# Patient Record
Sex: FEMALE | Race: WHITE | Employment: FULL TIME | URBAN - METROPOLITAN AREA
[De-identification: names, ages, dates, MRNs, and addresses within clinical notes are randomized per-mention and may not be internally consistent; named-entity substitution may affect disease eponyms.]

---

## 2017-06-12 ENCOUNTER — OFFICE VISIT (OUTPATIENT)
Dept: PODIATRY CLINIC | Facility: CLINIC | Age: 52
End: 2017-06-12

## 2017-06-12 DIAGNOSIS — D36.13 NEUROMA OF FOOT: Primary | ICD-10-CM

## 2017-06-12 PROCEDURE — 99203 OFFICE O/P NEW LOW 30 MIN: CPT | Performed by: PODIATRIST

## 2017-07-17 ENCOUNTER — TELEPHONE (OUTPATIENT)
Dept: PODIATRY CLINIC | Facility: CLINIC | Age: 52
End: 2017-07-17

## 2017-07-17 RX ORDER — METHYLPREDNISOLONE 4 MG/1
TABLET ORAL
Qty: 1 PACKAGE | Refills: 0 | Status: SHIPPED | OUTPATIENT
Start: 2017-07-17 | End: 2017-09-05 | Stop reason: ALTCHOICE

## 2017-07-17 NOTE — TELEPHONE ENCOUNTER
Patient states SCR told patient to take advil for pain. Patient took medication for 3 weeks but had to stop due to stomach pains from taking advil. States foot is better but still expieriencing some pain.  Would like to know instead of cortisone shot if she

## 2017-08-01 ENCOUNTER — OFFICE VISIT (OUTPATIENT)
Dept: PODIATRY CLINIC | Facility: CLINIC | Age: 52
End: 2017-08-01

## 2017-08-01 VITALS — HEART RATE: 64 BPM | SYSTOLIC BLOOD PRESSURE: 108 MMHG | DIASTOLIC BLOOD PRESSURE: 62 MMHG | RESPIRATION RATE: 16 BRPM

## 2017-08-01 DIAGNOSIS — D36.13 NEUROMA OF FOOT: Primary | ICD-10-CM

## 2017-08-01 PROCEDURE — 20600 DRAIN/INJ JOINT/BURSA W/O US: CPT | Performed by: PODIATRIST

## 2017-08-01 PROCEDURE — 99212 OFFICE O/P EST SF 10 MIN: CPT | Performed by: PODIATRIST

## 2017-08-01 RX ORDER — METHYLPREDNISOLONE ACETATE 80 MG/ML
20 INJECTION, SUSPENSION INTRA-ARTICULAR; INTRALESIONAL; INTRAMUSCULAR; SOFT TISSUE ONCE
Status: SHIPPED | OUTPATIENT
Start: 2017-08-01

## 2017-08-01 NOTE — PROGRESS NOTES
HPI:    Patient ID: Sonja Ward is a 46year old female. HPI  This 78-year-old female presents with continued left forefoot pain unresolved by oral anti-inflammatories.   Review of Systems  I reviewed present medical status, medications were noted, sh

## 2017-08-01 NOTE — PROGRESS NOTES
Per verbal order from Dr. Theron Nelson, draw up 0.5ml of 0.5% Marcaine and 20 mg of Depo-Medrol for cortisone injection to left foot. Time out proceeded 68-/62 Pt signed consent for left foot steroid injection. Medication administration per Dr. Theron Nelson.  Pt

## 2017-09-05 ENCOUNTER — OFFICE VISIT (OUTPATIENT)
Dept: PODIATRY CLINIC | Facility: CLINIC | Age: 52
End: 2017-09-05

## 2017-09-05 DIAGNOSIS — D36.13 NEUROMA OF FOOT: Primary | ICD-10-CM

## 2017-09-05 PROCEDURE — 99212 OFFICE O/P EST SF 10 MIN: CPT | Performed by: PODIATRIST

## 2017-09-05 NOTE — PROGRESS NOTES
HPI:    Patient ID: Gita King is a 46year old female. HPI  This pleasant 57-year-old female presents post injection for neuroma third interspace left foot. She has not had a significant noted improvement after the injection.   Patient had had some

## 2017-12-01 ENCOUNTER — TELEPHONE (OUTPATIENT)
Dept: PODIATRY CLINIC | Facility: CLINIC | Age: 52
End: 2017-12-01

## 2017-12-01 NOTE — TELEPHONE ENCOUNTER
pt called. pt upset, she was sent to collections for  a No Show for [facility]  7/17/17. She spoke with billing and they suggested they speak with SCR to have removed.   The no show visit was a misunderstanding, we didn't have her updated phone number, she did

## 2017-12-04 NOTE — TELEPHONE ENCOUNTER
Call to the billing dept. Spoke to [facility]. Discussed the pt being very upset with the bill to collections and the no show charge  According to [facility] any patient is desean allowed one revoke on a No Show in a lifetime.   Servando Ruth contacted her supervi

## 2018-11-24 ENCOUNTER — TELEPHONE (OUTPATIENT)
Dept: OBGYN CLINIC | Facility: CLINIC | Age: 53
End: 2018-11-24

## 2018-11-24 NOTE — TELEPHONE ENCOUNTER
Per pt just wanted to know if we could extend the time of her annual because he may want to do u/s. Pt informed unable to extend since he was booked that morning. No further question.

## 2018-11-26 ENCOUNTER — OFFICE VISIT (OUTPATIENT)
Dept: OBGYN CLINIC | Facility: CLINIC | Age: 53
End: 2018-11-26
Payer: COMMERCIAL

## 2018-11-26 VITALS
HEART RATE: 92 BPM | DIASTOLIC BLOOD PRESSURE: 78 MMHG | SYSTOLIC BLOOD PRESSURE: 120 MMHG | BODY MASS INDEX: 24 KG/M2 | WEIGHT: 126 LBS

## 2018-11-26 DIAGNOSIS — Z12.31 SCREENING MAMMOGRAM, ENCOUNTER FOR: ICD-10-CM

## 2018-11-26 DIAGNOSIS — Z01.419 WOMEN'S ANNUAL ROUTINE GYNECOLOGICAL EXAMINATION: Primary | ICD-10-CM

## 2018-11-26 PROCEDURE — 99386 PREV VISIT NEW AGE 40-64: CPT | Performed by: OBSTETRICS & GYNECOLOGY

## 2018-11-26 NOTE — PROGRESS NOTES
HPI:    Patient ID: Debby Lynne is a 52year old female. HPI  Well woman exam  Last visit 4 years ago  Menopausal for the last 2 years. Complains of sleeping on refreshed and somewhat poorly.   Her mother told her that she holds her breath during the cryosurgery    • Spontaneous        Past Surgical History:   Procedure Laterality Date   •       40 week 7 lb(s) 9 oz Male   •       40 week 8 lb(s) 4 oz Female LCT, tubal ligation, AMA   • COLONOSCOPY N/A 10/18/2016 negative in the left inguinal area. Lymphadenopathy:        Right: No inguinal adenopathy present. Left: No inguinal adenopathy present. Breasts:    Symmetric bilaterally. Areolas without lesions.   Skin- normal without growths, lesions, eryth BILAT ([id number])        [id number]

## 2018-11-29 ENCOUNTER — TELEPHONE (OUTPATIENT)
Dept: OBGYN CLINIC | Facility: CLINIC | Age: 53
End: 2018-11-29

## 2018-11-29 NOTE — TELEPHONE ENCOUNTER
Pt informed of Dr. Rula Jason message below--    ---- Message from Farida Torrez MD sent at 11/29/2018 10:58 AM CST -----  Please inform of Pap test showing cells that are mildly changed from normal (atypical, ASCUS).   The reflex test showed presence of hi

## 2018-11-29 NOTE — TELEPHONE ENCOUNTER
----- Message from Estefania Key MD sent at 11/29/2018 10:58 AM CST -----  Please inform of Pap test showing cells that are mildly changed from normal (atypical, ASCUS). 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

## 2018-12-03 ENCOUNTER — TELEPHONE (OUTPATIENT)
Dept: OBGYN CLINIC | Facility: CLINIC | Age: 53
End: 2018-12-03

## 2018-12-03 NOTE — TELEPHONE ENCOUNTER
Pt voices she received a [facility] message on 18 that her pap was normal. Reviewed pt's 18 results with her and advised her of Dr. Sheri Apgar message below-    Notes recorded by Vanesa Redman MD on 2018 at 10:58 AM CST  Please inform of Pa

## 2018-12-10 ENCOUNTER — OFFICE VISIT (OUTPATIENT)
Dept: OBGYN CLINIC | Facility: CLINIC | Age: 53
End: 2018-12-10
Payer: COMMERCIAL

## 2018-12-10 VITALS
HEART RATE: 80 BPM | SYSTOLIC BLOOD PRESSURE: 113 MMHG | BODY MASS INDEX: 24 KG/M2 | WEIGHT: 128.38 LBS | DIASTOLIC BLOOD PRESSURE: 72 MMHG

## 2018-12-10 DIAGNOSIS — R87.810 CERVICAL HIGH RISK HUMAN PAPILLOMAVIRUS (HPV) DNA TEST POSITIVE: ICD-10-CM

## 2018-12-10 DIAGNOSIS — R87.610 ATYPICAL SQUAMOUS CELL CHANGES OF UNDETERMINED SIGNIFICANCE (ASCUS) ON CERVICAL CYTOLOGY WITH POSITIVE HIGH RISK HUMAN PAPILLOMA VIRUS (HPV): Primary | ICD-10-CM

## 2018-12-10 DIAGNOSIS — R87.610 PAPANICOLAOU SMEAR OF CERVIX WITH ATYPICAL SQUAMOUS CELLS OF UNDETERMINED SIGNIFICANCE (ASC-US): ICD-10-CM

## 2018-12-10 DIAGNOSIS — Z01.812 PRE-PROCEDURAL LABORATORY EXAMINATION: ICD-10-CM

## 2018-12-10 DIAGNOSIS — R87.810 ATYPICAL SQUAMOUS CELL CHANGES OF UNDETERMINED SIGNIFICANCE (ASCUS) ON CERVICAL CYTOLOGY WITH POSITIVE HIGH RISK HUMAN PAPILLOMA VIRUS (HPV): Primary | ICD-10-CM

## 2018-12-10 PROCEDURE — 81025 URINE PREGNANCY TEST: CPT | Performed by: OBSTETRICS & GYNECOLOGY

## 2018-12-10 PROCEDURE — 57452 EXAM OF CERVIX W/SCOPE: CPT | Performed by: OBSTETRICS & GYNECOLOGY

## 2018-12-10 PROCEDURE — 99212 OFFICE O/P EST SF 10 MIN: CPT | Performed by: OBSTETRICS & GYNECOLOGY

## 2018-12-10 NOTE — PROCEDURES
Colposcopy     Pregnancy Results: negative from urine test   Birth control method(s) used: menopausal    Consent signed. Procedure discussed with patient in detail including indication, risk, benefits, alternatives and complications. Procedure:   The pa

## 2018-12-27 ENCOUNTER — MED REC SCAN ONLY (OUTPATIENT)
Dept: OBGYN CLINIC | Facility: CLINIC | Age: 53
End: 2018-12-27

## 2019-01-19 ENCOUNTER — MED REC SCAN ONLY (OUTPATIENT)
Dept: OBGYN CLINIC | Facility: CLINIC | Age: 54
End: 2019-01-19

## 2019-09-18 ENCOUNTER — TELEPHONE (OUTPATIENT)
Dept: GASTROENTEROLOGY | Facility: CLINIC | Age: 54
End: 2019-09-18

## 2019-09-18 NOTE — TELEPHONE ENCOUNTER
----- Message from Kirby Shipman RN sent at 10/21/2016  2:44 PM CDT -----  Regarding: CLN recall  3 year CLN recall, per Dr. Leonard Garcia; CLN done 10/18/16

## 2019-09-23 ENCOUNTER — TELEPHONE (OUTPATIENT)
Dept: GASTROENTEROLOGY | Facility: CLINIC | Age: 54
End: 2019-09-23

## 2019-09-23 DIAGNOSIS — Z86.010 PERSONAL HISTORY OF COLONIC POLYPS: ICD-10-CM

## 2019-09-23 DIAGNOSIS — Z12.11 COLON CANCER SCREENING: Primary | ICD-10-CM

## 2019-09-23 NOTE — TELEPHONE ENCOUNTER
Spoke to pt and updated allergies, PCP, pharmacy, medications and the following:    Last Procedure, Date, MD: SOMMER 10/18/2016 with Dr. Nathaniel Casas  Last Diagnosis:  Impression:   1. Tortuous colon.   2. Successful colonoscopy to the cecum using pediatric colonoscope

## 2019-09-26 NOTE — TELEPHONE ENCOUNTER
Noted, thank you Dr. Nena Gregory. Please clarify the prep below per pt request. She did \"not tolerate\" the last PEG and specifically requested miraLAX/gatorade. Please advise, thank you.

## 2019-09-26 NOTE — TELEPHONE ENCOUNTER
Please schedule: CLN with [facility]. Prefer to be done in hospital, however if patient wants to do it on a Monday with her  (who also is coming in to see me for a c-scope), then it would be okay for [facility]    Please pend split trilyte bowel prep.      [place] [phone]

## 2019-09-27 NOTE — TELEPHONE ENCOUNTER
CBLM to schedule procedure. Please transfer to Autumn Duncan at ext [phone] or [phone] for scheduling.

## 2019-10-03 NOTE — TELEPHONE ENCOUNTER
Scheduled for:  Colonoscopy - 14630  Provider Name:  Dr. Vale Melton  Date:  1/7/20  Location:  [facility]  Sedation:  [facility]  Time:  7:30 am (pt is aware to arrive at 6:30 am)  Prep:  Miralax/Gatorade, Prep instructions were given to pt over the phone, pt verbalized unde

## 2020-01-06 ENCOUNTER — ANESTHESIA EVENT (OUTPATIENT)
Dept: ENDOSCOPY | Facility: HOSPITAL | Age: 55
End: 2020-01-06
Payer: COMMERCIAL

## 2020-01-07 ENCOUNTER — HOSPITAL ENCOUNTER (OUTPATIENT)
Facility: HOSPITAL | Age: 55
Setting detail: HOSPITAL OUTPATIENT SURGERY
Discharge: HOME | End: 2020-01-07
Attending: INTERNAL MEDICINE | Admitting: INTERNAL MEDICINE
Payer: COMMERCIAL

## 2020-01-07 ENCOUNTER — ANESTHESIA (OUTPATIENT)
Dept: ENDOSCOPY | Facility: HOSPITAL | Age: 55
End: 2020-01-07
Payer: COMMERCIAL

## 2020-01-07 VITALS
HEART RATE: 71 BPM | SYSTOLIC BLOOD PRESSURE: 110 MMHG | DIASTOLIC BLOOD PRESSURE: 66 MMHG | HEIGHT: 61 IN | WEIGHT: 125 LBS | BODY MASS INDEX: 23.6 KG/M2 | RESPIRATION RATE: 11 BRPM | OXYGEN SATURATION: 99 %

## 2020-01-07 DIAGNOSIS — Z86.010 PERSONAL HISTORY OF COLONIC POLYPS: ICD-10-CM

## 2020-01-07 DIAGNOSIS — Z12.11 COLON CANCER SCREENING: ICD-10-CM

## 2020-01-07 PROCEDURE — 45385 COLONOSCOPY W/LESION REMOVAL: CPT | Performed by: INTERNAL MEDICINE

## 2020-01-07 PROCEDURE — 0DBN8ZX EXCISION OF SIGMOID COLON, VIA NATURAL OR ARTIFICIAL OPENING ENDOSCOPIC, DIAGNOSTIC: ICD-10-PCS | Performed by: INTERNAL MEDICINE

## 2020-01-07 RX ORDER — SODIUM CHLORIDE, SODIUM LACTATE, POTASSIUM CHLORIDE, CALCIUM CHLORIDE 600; 310; 30; 20 MG/100ML; MG/100ML; MG/100ML; MG/100ML
INJECTION, SOLUTION INTRAVENOUS CONTINUOUS
Status: DISCONTINUED | OUTPATIENT
Start: 2020-01-07 | End: 2020-01-07

## 2020-01-07 RX ADMIN — SODIUM CHLORIDE, SODIUM LACTATE, POTASSIUM CHLORIDE, CALCIUM CHLORIDE: 600; 310; 30; 20 INJECTION, SOLUTION INTRAVENOUS at 08:09:00

## 2020-01-07 NOTE — ANESTHESIA POSTPROCEDURE EVALUATION
Patient:  Jason Wolfe    Procedure Summary     Date:  01/07/20 Room / Location:  [facility] ENDOSCOPY 01 / [facility] ENDOSCOPY    Anesthesia Start:  9019 Anesthesia Stop:      Procedure:  COLONOSCOPY (N/A ) Diagnosis:       Colon cancer screening      Personal histor

## 2020-01-07 NOTE — ANESTHESIA PREPROCEDURE EVALUATION
Anesthesia PreOp Note    HPI:     Linh Paul is a 62year old female who presents for preoperative consultation requested by: Sara Lopez MD    Date of Surgery: 1/7/2020    Procedure(s):  COLONOSCOPY  Indication: Colon cancer screening, Personal h Known Allergies    Family History   Problem Relation Age of Onset   • Cancer Other         colon cancer   • High Cholesterol Father    • Heart Disease Father    • Cancer Father         Lung cancer -asbestos   • Genito-Urinary Disorder Mother         dysfun Asked        Stress Concern: Not Asked        Weight Concern: Not Asked        Special Diet: Not Asked        Back Care: Not Asked        Exercise: Not Asked        Bike Helmet: Not Asked        Seat Belt: Not Asked        Self-Exams: Not Asked    Social H

## 2020-01-07 NOTE — H&P
History & Physical Examination    Patient Name: Sonja Ward  MRN: [id number]  [facility]: [id number]  YOB: 1965    Diagnosis: screening for colon cancer    methylPREDNISolone acetate (DEPO-medrol) 80 MG/ML injection 20 mg, 20 mg, Intra-articular proceed with plan of care. I have reviewed the History and Physical done within the last 30 days. Any changes noted above.     Luis Miguel Zhang, [address] [facility]  1/7/2020  7:37 AM

## 2020-01-07 NOTE — OPERATIVE REPORT
COLONOSCOPY REPORT    Kaylee Vora    CABRERA 10/30/1965 Age 62year old   PCP Timmy Tinoco MD     Date of procedure: 20    Procedure: Colonoscopy w/cold snare polypectomy    Pre-operative diagnosis: screening    Po hemorrhoids. 5. The colonic mucosa throughout the colon showed normal vascular pattern, without evidence of angioectasias or inflammation. Tortuous sigmoid colon. 6. GALILEO: normal rectal tone, no masses palpated.      Impression:   · One small colon gisella

## 2020-01-13 ENCOUNTER — TELEPHONE (OUTPATIENT)
Dept: GASTROENTEROLOGY | Facility: CLINIC | Age: 55
End: 2020-01-13

## 2020-01-13 NOTE — TELEPHONE ENCOUNTER
I mailed out colonoscopy results letter to pt  Updated health maintenance  Entered into 5 yr CLN recall  Recall colon in 5 years per.  Colon done 1/07/2020    GI staff: please place recall for colonoscopy in 5 years

## 2020-05-20 ENCOUNTER — OFFICE VISIT (OUTPATIENT)
Dept: PODIATRY CLINIC | Facility: CLINIC | Age: 55
End: 2020-05-20
Payer: COMMERCIAL

## 2020-05-20 ENCOUNTER — TELEPHONE (OUTPATIENT)
Dept: PODIATRY CLINIC | Facility: CLINIC | Age: 55
End: 2020-05-20

## 2020-05-20 DIAGNOSIS — D36.10 NEUROMA: Primary | ICD-10-CM

## 2020-05-20 PROCEDURE — 99213 OFFICE O/P EST LOW 20 MIN: CPT | Performed by: PODIATRIST

## 2020-05-20 NOTE — TELEPHONE ENCOUNTER
Per pt her left foot is bothering her, has hard time walking, asking for appointment soon. Pt was last seen in 2017. Please advise thank you.

## 2020-05-20 NOTE — TELEPHONE ENCOUNTER
S/w pt and she states her left foot pain hasn't improved since LOV in 2017, but pain has increased over the past 2 months and there is numbness. Pt denies any swelling. Pt can WB. Pt rates her pain 9/10. Pt takes advil 400mg BID prn pain.  appt made today @

## 2020-05-20 NOTE — PROGRESS NOTES
HPI:    Patient ID: Abdon Pruett is a 62year old female. This 12-year-old female presents to the office having not been seen in almost 3 years. She states that she is having the same left foot pain that has not resolved.   It seems to be getting wors diagnosis)    No orders of the defined types were placed in this encounter.       Meds This Visit:  Requested Prescriptions      No prescriptions requested or ordered in this encounter       Imaging & Referrals:  None       XD#[id number]

## 2020-05-29 ENCOUNTER — TELEPHONE (OUTPATIENT)
Dept: PODIATRY CLINIC | Facility: CLINIC | Age: 55
End: 2020-05-29

## 2020-05-29 NOTE — TELEPHONE ENCOUNTER
S/w pt. Scheduled her procedure with Dr. Mandie Hawkins on 6/10/2020 for neuroma 3rd left toe. Went over meds she would need to stop one week prior to sx, and location of sx. Scheduled her 1 & 2 week post op visits. Answered all of her questions and concerns.

## 2020-06-09 ENCOUNTER — LAB REQUISITION (OUTPATIENT)
Dept: LAB | Facility: HOSPITAL | Age: 55
End: 2020-06-09
Payer: COMMERCIAL

## 2020-06-09 ENCOUNTER — TELEPHONE (OUTPATIENT)
Dept: PODIATRY CLINIC | Facility: CLINIC | Age: 55
End: 2020-06-09

## 2020-06-09 DIAGNOSIS — Z20.828 CONTACT WITH AND (SUSPECTED) EXPOSURE TO OTHER VIRAL COMMUNICABLE DISEASES: ICD-10-CM

## 2020-06-09 NOTE — TELEPHONE ENCOUNTER
S/w pt and she states she didn't like how the sx center wanted her to put a credit card on file and how much she would have to pay out of pocket for sx.  I did inform her that Dr. Paco Morgan does most of his sx at [facility] and that it would most likely be too late

## 2020-06-09 NOTE — TELEPHONE ENCOUNTER
Pt called stating pt is scheduled for surgery 6-10-20 at the [facility]. Pt has question,  Can surgery be done at the hospital instead.   Call pt to advise

## 2020-06-10 ENCOUNTER — LAB REQUISITION (OUTPATIENT)
Dept: LAB | Facility: HOSPITAL | Age: 55
End: 2020-06-10
Payer: COMMERCIAL

## 2020-06-10 DIAGNOSIS — G57.60 LESION OF PLANTAR NERVE, UNSPECIFIED LOWER LIMB: ICD-10-CM

## 2020-06-10 PROCEDURE — 88304 TISSUE EXAM BY PATHOLOGIST: CPT | Performed by: PODIATRIST

## 2020-06-15 ENCOUNTER — OFFICE VISIT (OUTPATIENT)
Dept: PODIATRY CLINIC | Facility: CLINIC | Age: 55
End: 2020-06-15
Payer: COMMERCIAL

## 2020-06-15 DIAGNOSIS — D36.10 NEUROMA: Primary | ICD-10-CM

## 2020-06-15 PROCEDURE — 99024 POSTOP FOLLOW-UP VISIT: CPT | Performed by: PODIATRIST

## 2020-06-15 NOTE — PROGRESS NOTES
HPI:    Patient ID: Fidelia Tolentino is a 62year old female. This 53-year-old female presents postsurgical for neuroma third interspace left foot. She is taken some medication for pain the first day or so but now is doing quite well.   She does have some g

## 2020-06-22 ENCOUNTER — OFFICE VISIT (OUTPATIENT)
Dept: PODIATRY CLINIC | Facility: CLINIC | Age: 55
End: 2020-06-22
Payer: COMMERCIAL

## 2020-06-22 DIAGNOSIS — D36.10 NEUROMA: Primary | ICD-10-CM

## 2020-06-22 PROCEDURE — 99024 POSTOP FOLLOW-UP VISIT: CPT | Performed by: PODIATRIST

## 2020-06-22 NOTE — PROGRESS NOTES
HPI:    Patient ID: eGetha Hong is a 62year old female. This 42-year-old female presents 2 weeks post neuroma excision third interspace left foot.   Patient is not requiring pain medication she has some discomfort but it does not seem to be limiting he

## 2020-06-29 ENCOUNTER — TELEPHONE (OUTPATIENT)
Dept: PODIATRY CLINIC | Facility: CLINIC | Age: 55
End: 2020-06-29

## 2020-06-29 NOTE — TELEPHONE ENCOUNTER
Spoke to pt and she wanted to confirm Dr Betsy Wright instructions from [address] [place] [address] on 06/22/20. Advised pt of the following from Dr. Pricilla Morgan office notes:  \"A light dry sterile dressing was reapplied and reinforced.   Continue limited weightbearing elevation an

## 2020-06-29 NOTE — TELEPHONE ENCOUNTER
Spoke to pt and informed her of Juan Manuel's response and instructions. Pt verbalized understanding.

## 2020-07-13 ENCOUNTER — OFFICE VISIT (OUTPATIENT)
Dept: PODIATRY CLINIC | Facility: CLINIC | Age: 55
End: 2020-07-13
Payer: COMMERCIAL

## 2020-07-13 DIAGNOSIS — D36.10 NEUROMA: Primary | ICD-10-CM

## 2020-07-13 PROCEDURE — 99024 POSTOP FOLLOW-UP VISIT: CPT | Performed by: PODIATRIST

## 2020-07-13 NOTE — PROGRESS NOTES
HPI:    Patient ID: Sonja Ward is a 62year old female. 49-year-old female presents postsurgical for neuroma third interspace left foot. She really has no specific noted complaints or concerns and states she thinks she is doing well.   She is more and

## 2020-08-10 ENCOUNTER — OFFICE VISIT (OUTPATIENT)
Dept: PODIATRY CLINIC | Facility: CLINIC | Age: 55
End: 2020-08-10
Payer: COMMERCIAL

## 2020-08-10 DIAGNOSIS — D36.10 NEUROMA: Primary | ICD-10-CM

## 2020-08-10 PROCEDURE — 99024 POSTOP FOLLOW-UP VISIT: CPT | Performed by: PODIATRIST

## 2020-08-10 NOTE — PROGRESS NOTES
HPI:    Patient ID: Pastor Singleton is a 62year old female. 41-year-old female presents postsurgical for neuroma third interspace left foot. She has no noted specific complaints or concerns.   She has returned to all normal weightbearing activities withou

## 2022-02-10 NOTE — PROGRESS NOTES
HPI:    Patient ID: Pastor Singleton is a 46year old female. HPI  [de-identified] pleasant 12-year-old female presents as a new patient to me and states that she is referred by her sister.   Chief complaint is left forefoot pain that has been present for about 6 month requested or ordered in this encounter    Imaging & Referrals:  None       TC#[id number] Quality 130: Documentation Of Current Medications In The Medical Record: Current Medications Documented Detail Level: Detailed

## 2023-03-06 ENCOUNTER — TELEPHONE (OUTPATIENT)
Dept: OPHTHALMOLOGY | Facility: CLINIC | Age: 58
End: 2023-03-06

## 2023-03-06 NOTE — TELEPHONE ENCOUNTER
Per pt is seeing a \"wavy green color\", asking if she is needing to schedule appt. Pt was last seen 2016.  Please advise

## 2023-03-06 NOTE — TELEPHONE ENCOUNTER
Spoke to pt and pt states she has been having a sinus infection (started on 2/28/23) and states she noticed a \"lime green\" shadow on objects and sees wavy lines. Pt states she has noticed a bit of improvement now that she is on antibiotics for the sinus infection but is still experiencing the wavy lines and green shadow and wants to make sure eyes are ok. Pt's last eye exam was about 3 months ago with Dr. James Verdugo at [facility] (undilated)  and was told she did not need any glasses. Offered to schedule pt tomorrow or Wednesday but pt declined and asked for an appointment on Thursday. Scheduled pt on 3/9/23 @ 8:30am for a dilated eye exam with VENKATESH.

## 2023-03-09 ENCOUNTER — OFFICE VISIT (OUTPATIENT)
Dept: OPHTHALMOLOGY | Facility: CLINIC | Age: 58
End: 2023-03-09

## 2023-03-09 DIAGNOSIS — H35.81 MACULAR EDEMA OF RIGHT EYE: Primary | ICD-10-CM

## 2023-03-09 DIAGNOSIS — H25.13 AGE-RELATED NUCLEAR CATARACT OF BOTH EYES: ICD-10-CM

## 2023-03-09 DIAGNOSIS — H43.393 FLOATERS IN VISUAL FIELD, BILATERAL: ICD-10-CM

## 2023-03-09 PROCEDURE — 92004 COMPRE OPH EXAM NEW PT 1/>: CPT | Performed by: OPHTHALMOLOGY

## 2023-03-09 RX ORDER — AMOXICILLIN 875 MG/1
875 TABLET, COATED ORAL 2 TIMES DAILY
COMMUNITY
Start: 2023-03-02

## 2023-03-09 NOTE — ASSESSMENT & PLAN NOTE
There is no evidence of retinal pathology. All signs and symptoms of retinal detachment/tears explained in detail. Patient instructed to call the office if they experience those symptoms. Information given.

## 2023-03-09 NOTE — PATIENT INSTRUCTIONS
Macular edema of right eye  Pt scheduled with Dr. Jamie Padron tomorrow 3/10/23 at 8:30    Floaters in visual field, bilateral   There is no evidence of retinal pathology. All signs and symptoms of retinal detachment/tears explained in detail. Patient instructed to call the office if they experience those symptoms. Information given. Age-related nuclear cataract of both eyes  Discussed early cataracts with patient. Told patient that cataracts are age appropriate and they are not surgical at this time. No treatment recommended at this time.

## 2023-03-13 ENCOUNTER — HOSPITAL ENCOUNTER (EMERGENCY)
Facility: HOSPITAL | Age: 58
Discharge: HOME | End: 2023-03-13
Attending: STUDENT IN AN ORGANIZED HEALTH CARE EDUCATION/TRAINING PROGRAM
Payer: MEDICAID

## 2023-03-13 ENCOUNTER — APPOINTMENT (OUTPATIENT)
Dept: MRI IMAGING | Facility: HOSPITAL | Age: 58
End: 2023-03-13
Attending: STUDENT IN AN ORGANIZED HEALTH CARE EDUCATION/TRAINING PROGRAM
Payer: MEDICAID

## 2023-03-13 VITALS
OXYGEN SATURATION: 100 % | DIASTOLIC BLOOD PRESSURE: 55 MMHG | TEMPERATURE: 99 F | HEIGHT: 61 IN | HEART RATE: 60 BPM | WEIGHT: 120 LBS | SYSTOLIC BLOOD PRESSURE: 110 MMHG | RESPIRATION RATE: 18 BRPM | BODY MASS INDEX: 22.66 KG/M2

## 2023-03-13 DIAGNOSIS — C69.31 CHOROIDAL MALIGNANT MELANOMA, RIGHT (HCC): Primary | ICD-10-CM

## 2023-03-13 LAB
ALBUMIN SERPL-MCNC: 4.1 G/DL (ref 3.4–5)
ALP LIVER SERPL-CCNC: 63 U/L
ALT SERPL-CCNC: 21 U/L
ANION GAP SERPL CALC-SCNC: 8 MMOL/L (ref 0–18)
AST SERPL-CCNC: 18 U/L (ref 15–37)
BASOPHILS # BLD AUTO: 0.05 X10(3) UL (ref 0–0.2)
BASOPHILS NFR BLD AUTO: 0.6 %
BILIRUB DIRECT SERPL-MCNC: 0.1 MG/DL (ref 0–0.2)
BILIRUB SERPL-MCNC: 0.7 MG/DL (ref 0.1–2)
BUN BLD-MCNC: 18 MG/DL (ref 7–18)
BUN/CREAT SERPL: 21.4 (ref 10–20)
CALCIUM BLD-MCNC: 9.3 MG/DL (ref 8.5–10.1)
CHLORIDE SERPL-SCNC: 108 MMOL/L (ref 98–112)
CO2 SERPL-SCNC: 25 MMOL/L (ref 21–32)
CREAT BLD-MCNC: 0.84 MG/DL
DEPRECATED RDW RBC AUTO: 42.9 FL (ref 35.1–46.3)
EOSINOPHIL # BLD AUTO: 0.06 X10(3) UL (ref 0–0.7)
EOSINOPHIL NFR BLD AUTO: 0.7 %
ERYTHROCYTE [DISTWIDTH] IN BLOOD BY AUTOMATED COUNT: 13.1 % (ref 11–15)
GFR SERPLBLD BASED ON 1.73 SQ M-ARVRAT: 80 ML/MIN/1.73M2 (ref 60–?)
GLUCOSE BLD-MCNC: 91 MG/DL (ref 70–99)
HCT VFR BLD AUTO: 42.7 %
HGB BLD-MCNC: 13.8 G/DL
IMM GRANULOCYTES # BLD AUTO: 0.01 X10(3) UL (ref 0–1)
IMM GRANULOCYTES NFR BLD: 0.1 %
LYMPHOCYTES # BLD AUTO: 2.8 X10(3) UL (ref 1–4)
LYMPHOCYTES NFR BLD AUTO: 35 %
MCH RBC QN AUTO: 28.6 PG (ref 26–34)
MCHC RBC AUTO-ENTMCNC: 32.3 G/DL (ref 31–37)
MCV RBC AUTO: 88.6 FL
MONOCYTES # BLD AUTO: 0.33 X10(3) UL (ref 0.1–1)
MONOCYTES NFR BLD AUTO: 4.1 %
NEUTROPHILS # BLD AUTO: 4.76 X10 (3) UL (ref 1.5–7.7)
NEUTROPHILS # BLD AUTO: 4.76 X10(3) UL (ref 1.5–7.7)
NEUTROPHILS NFR BLD AUTO: 59.5 %
OSMOLALITY SERPL CALC.SUM OF ELEC: 293 MOSM/KG (ref 275–295)
PLATELET # BLD AUTO: 253 10(3)UL (ref 150–450)
POTASSIUM SERPL-SCNC: 4.1 MMOL/L (ref 3.5–5.1)
PROT SERPL-MCNC: 7.5 G/DL (ref 6.4–8.2)
RBC # BLD AUTO: 4.82 X10(6)UL
SODIUM SERPL-SCNC: 141 MMOL/L (ref 136–145)
WBC # BLD AUTO: 8 X10(3) UL (ref 4–11)

## 2023-03-13 PROCEDURE — 85025 COMPLETE CBC W/AUTO DIFF WBC: CPT | Performed by: STUDENT IN AN ORGANIZED HEALTH CARE EDUCATION/TRAINING PROGRAM

## 2023-03-13 PROCEDURE — 80076 HEPATIC FUNCTION PANEL: CPT | Performed by: STUDENT IN AN ORGANIZED HEALTH CARE EDUCATION/TRAINING PROGRAM

## 2023-03-13 PROCEDURE — A9575 INJ GADOTERATE MEGLUMI 0.1ML: HCPCS | Performed by: STUDENT IN AN ORGANIZED HEALTH CARE EDUCATION/TRAINING PROGRAM

## 2023-03-13 PROCEDURE — 96374 THER/PROPH/DIAG INJ IV PUSH: CPT

## 2023-03-13 PROCEDURE — 70543 MRI ORBT/FAC/NCK W/O &W/DYE: CPT | Performed by: STUDENT IN AN ORGANIZED HEALTH CARE EDUCATION/TRAINING PROGRAM

## 2023-03-13 PROCEDURE — 99285 EMERGENCY DEPT VISIT HI MDM: CPT

## 2023-03-13 PROCEDURE — 70553 MRI BRAIN STEM W/O & W/DYE: CPT | Performed by: STUDENT IN AN ORGANIZED HEALTH CARE EDUCATION/TRAINING PROGRAM

## 2023-03-13 PROCEDURE — 80048 BASIC METABOLIC PNL TOTAL CA: CPT | Performed by: STUDENT IN AN ORGANIZED HEALTH CARE EDUCATION/TRAINING PROGRAM

## 2023-03-13 RX ORDER — GADOTERATE MEGLUMINE 376.9 MG/ML
15 INJECTION INTRAVENOUS
Status: COMPLETED | OUTPATIENT
Start: 2023-03-13 | End: 2023-03-13

## 2023-03-13 RX ORDER — LORAZEPAM 2 MG/ML
0.5 INJECTION INTRAMUSCULAR ONCE
Status: COMPLETED | OUTPATIENT
Start: 2023-03-13 | End: 2023-03-13

## 2023-03-13 RX ORDER — LORAZEPAM 2 MG/ML
0.5 INJECTION INTRAMUSCULAR
Status: DISCONTINUED | OUTPATIENT
Start: 2023-03-14 | End: 2023-03-13

## 2023-03-13 NOTE — ED QUICK NOTES
Patient's  came to nursing station and asked \"what's the hold up? \". Patient informed that ERMD will come talk to patiently shortly about test results. ERMD aware.

## 2023-03-13 NOTE — ED INITIAL ASSESSMENT (HPI)
Patient arrives ambulatory through triage with complaints of right eye vision issues that started 3/9/23, worsening since then. Patient states now there's a black [place] in the vision but can see around the darkness. Went to see retina specialist today and sent to ER for MRI. Right eye pupil dilated at eye doctor.

## 2024-12-12 ENCOUNTER — TELEPHONE (OUTPATIENT)
Dept: GASTROENTEROLOGY | Facility: CLINIC | Age: 59
End: 2024-12-12

## 2024-12-12 NOTE — TELEPHONE ENCOUNTER
Patient outreach message received:    Entered into 5 yr CLN recall  Recall colon in 5 years per. Colon done 1/07/2020    Recall reminder letter sent out to patient via [employer].

## (undated) DEVICE — LINE MNTR ADLT SET O2 INTMD

## (undated) DEVICE — Device: Brand: CUSTOM PROCEDURE KIT

## (undated) DEVICE — TRAP 4 CPTR CHMBR N EZ INLN

## (undated) DEVICE — 35 ML SYRINGE REGULAR TIP: Brand: MONOJECT

## (undated) DEVICE — SNARE ENDOSCOPIC 10MM ROUND

## (undated) DEVICE — MASK PROC W/VISOR ANTIGLARE

## (undated) DEVICE — MEDI-VAC NON-CONDUCTIVE SUCTION TUBING 6MM X 1.8M (6FT.) L: Brand: CARDINAL HEALTH

## (undated) NOTE — LETTER
12/12/2024    Gosia Zee        [address] [address]            Dear Gosia Zee,      Our records indicate that you are due for an appointment for a Colonoscopy with AMY Schmidt MD. Our doctors are booking out about 3-6 months in advance for procedures.     Please call our office to schedule this appointment.  Your medical well-being is important to us.    If your insurance requires a referral, please call your primary care office to request one.      Thank you,      The Physicians and Staff at [facility]

## (undated) NOTE — LETTER
9/18/2019    [address] [place] [address]            Dear Sonja Ward,      Our records indicate that you are due for an appointment for a Colonoscopy in October 2019, or shortly there after, with Kesha Hammonds MD.

## (undated) NOTE — LETTER
[facility]  [address] [address]  PH: [phone]  FAX: [phone]    Gosia Zee        [address] [place] [address]            Dear Gosia Zee,      Our records indicate that you are due for an appointment for a Colonoscopy with AMY Schmidt MD. Our doctors are booking out about 3-6 months in advance for procedures.     Please call our office to schedule this appointment.  Your medical well-being is important to us.    If your insurance requires a referral, please call your primary care office to request one.      Thank you,      The Physicians and Staff at [facility]

## (undated) NOTE — MR AVS SNAPSHOT
[address]  [address]  [phone]               Thank you for choosing us for your health care visit with Stephnaie Collins DPM.  We are glad to serve you and happy to provide yo your Zip Code and Date of Birth to complete the sign-up process. If you do not sign up before the expiration date, you must request a new code.     Your unique [employer] Access Code: [email or web address]  Expires: 8/11/2017  9:38 AM    If you have questions, you can c

## (undated) NOTE — LETTER
1/12/2020              [address]         Dear Inocencio Fearing,    The polyp I removed from your colon is benign and has no pre-cancerous changes. Please repeat your colonoscopy in 5 years (Jan 2025).     Sincerely,